# Patient Record
Sex: FEMALE | Race: ASIAN | NOT HISPANIC OR LATINO | Employment: FULL TIME | URBAN - METROPOLITAN AREA
[De-identification: names, ages, dates, MRNs, and addresses within clinical notes are randomized per-mention and may not be internally consistent; named-entity substitution may affect disease eponyms.]

---

## 2017-01-09 DIAGNOSIS — I10 ESSENTIAL HYPERTENSION WITH GOAL BLOOD PRESSURE LESS THAN 140/90: Primary | ICD-10-CM

## 2017-01-09 RX ORDER — LOSARTAN POTASSIUM 50 MG/1
50 TABLET ORAL DAILY
Qty: 30 TABLET | Refills: 0 | Status: SHIPPED | OUTPATIENT
Start: 2017-01-09 | End: 2017-01-31

## 2017-01-09 NOTE — TELEPHONE ENCOUNTER
Routing refill request to provider for review/approval because:  Emy given x1 and patient did not follow up, please advise  Patient needs to be seen because:  Needs nurse visit for BP check after dose adjustment on med (increase)

## 2017-01-31 ENCOUNTER — OFFICE VISIT (OUTPATIENT)
Dept: FAMILY MEDICINE | Facility: CLINIC | Age: 37
End: 2017-01-31

## 2017-01-31 VITALS
OXYGEN SATURATION: 99 % | BODY MASS INDEX: 39.63 KG/M2 | SYSTOLIC BLOOD PRESSURE: 152 MMHG | DIASTOLIC BLOOD PRESSURE: 105 MMHG | WEIGHT: 231 LBS | HEART RATE: 83 BPM | TEMPERATURE: 98.3 F

## 2017-01-31 DIAGNOSIS — H57.9 EYE PROBLEM: Primary | ICD-10-CM

## 2017-01-31 DIAGNOSIS — I10 ESSENTIAL HYPERTENSION WITH GOAL BLOOD PRESSURE LESS THAN 140/90: ICD-10-CM

## 2017-01-31 DIAGNOSIS — E11.9 TYPE 2 DIABETES MELLITUS WITHOUT COMPLICATION, WITHOUT LONG-TERM CURRENT USE OF INSULIN (H): ICD-10-CM

## 2017-01-31 RX ORDER — CIPROFLOXACIN HYDROCHLORIDE 3.5 MG/ML
1 SOLUTION/ DROPS TOPICAL 4 TIMES DAILY
Qty: 2 ML | Refills: 0 | Status: SHIPPED | OUTPATIENT
Start: 2017-01-31 | End: 2017-02-10

## 2017-01-31 RX ORDER — LOSARTAN POTASSIUM 50 MG/1
50 TABLET ORAL DAILY
Qty: 30 TABLET | Refills: 3 | Status: SHIPPED | OUTPATIENT
Start: 2017-01-31 | End: 2019-04-19

## 2017-01-31 ASSESSMENT — PAIN SCALES - GENERAL: PAINLEVEL: NO PAIN (0)

## 2017-01-31 NOTE — NURSING NOTE
36 year old  Chief Complaint   Patient presents with     Eye Problem     right eye pain when moved and readness in eye x 1 day       Blood pressure 150/107, pulse 83, temperature 98.3  F (36.8  C), weight 231 lb (104.781 kg), last menstrual period 01/25/2017, SpO2 99 %, not currently breastfeeding. Body mass index is 39.63 kg/(m^2).  Patient Active Problem List   Diagnosis     Rash and other nonspecific skin eruption     Asthma     Mild persistent asthma without complication     Acne, unspecified acne type     Benign essential hypertension     Type 2 diabetes mellitus without complication (H)     Vitamin D deficiency       Wt Readings from Last 2 Encounters:   01/31/17 231 lb (104.781 kg)   06/21/16 242 lb 1.3 oz (109.807 kg)     BP Readings from Last 3 Encounters:   01/31/17 150/107   09/28/16 134/91   06/21/16 138/96         Current Outpatient Prescriptions   Medication     MONTELUKAST SODIUM PO     losartan (COZAAR) 50 MG tablet     albuterol (VENTOLIN HFA) 108 (90 BASE) MCG/ACT inhaler     clindamycin (CLEOCIN T) 1 % lotion     mometasone (ASMANEX) 220 MCG/INH inhaler     No current facility-administered medications for this visit.       Social History   Substance Use Topics     Smoking status: Never Smoker      Smokeless tobacco: Never Used     Alcohol Use: 0.0 oz/week     0 Standard drinks or equivalent per week      Comment: occasionally       Health Maintenance Due   Topic Date Due     FOOT EXAM Q1 YEAR( NO INBASKET)  07/08/1980     EYE EXAM Q1 YEAR( NO INBASKET)  07/08/1980     MICROALBUMIN Q1 YEAR( NO INBASKET)  07/08/1980     ASTHMA ACTION PLAN Q1 YR (NO INBASKET)  07/08/1984     PAP SCREENING Q3 YR (SYSTEM ASSIGNED)  07/08/2000     TSH W/ FREE T4 REFLEX Q2 YEAR (NO INBASKET)  01/09/2015     INFLUENZA VACCINE (SYSTEM ASSIGNED)  09/01/2016     ASTHMA CONTROL TEST Q6 MOS (NO INBASKET)  12/21/2016     A1C Q6 MO( NO INBASKET)  12/21/2016       No results found for this basename: pap      January 31, 2017  10:53 AM

## 2017-01-31 NOTE — PROGRESS NOTES
Manisha Ovalles is a 36 year old female here for the following issues:    Right eye problem  Manisha is a 37 yo woman who wears contact lenses. Yesterday, while wearing her contacts she thought she might have gotten something in her eye. She removed her contact but later replaced it. Now her right eye is red, irritated. She is wearing her contacts to clinic today. No mattering, no fever, no vision changes. Mild headache.     HTN  She has a hx of HTN and had been prescribed  Losartan 50mg a day. However she admits she is not taking her medication. She would like rx to resume the medication. No current chest pain or leg edema.     Patient Active Problem List   Diagnosis     Rash and other nonspecific skin eruption     Asthma     Mild persistent asthma without complication     Acne, unspecified acne type     Benign essential hypertension     Type 2 diabetes mellitus without complication (H)     Vitamin D deficiency       Current Outpatient Prescriptions   Medication Sig Dispense Refill     losartan (COZAAR) 50 MG tablet Take 1 tablet (50 mg) by mouth daily Needs Nurse visit for BP check for any further refills. FINAL FILL 30 tablet 0     albuterol (VENTOLIN HFA) 108 (90 BASE) MCG/ACT inhaler Inhale 2 puffs into the lungs every 4 hours as needed for shortness of breath / dyspnea or wheezing 1 Inhaler 11     clindamycin (CLEOCIN T) 1 % lotion Apply topically 2 times daily 60 mL 3     mometasone (ASMANEX) 220 MCG/INH inhaler Inhale 2 puffs into the lungs daily 1 Inhaler 11       No Known Allergies     EXAM  /105 mmHg  Pulse 83  Temp(Src) 98.3  F (36.8  C)  Wt 231 lb (104.781 kg)  SpO2 99%  LMP 01/25/2017  Gen: Alert, pleasant, NAD  EYES: Scleral injection of right eye, PERRL,EOMI, no photophobia, no mattering  COR: S1,S2, no murmur  Lungs: CTA bilaterally, no rhonchi, wheezes or rales  Ext: no peripheral edema, pulses full    Procedure  Fluorescein stain to right eye and inspection with blue light. No distinct  abrasions    Assessment:  (H57.9) Eye problem  (primary encounter diagnosis)  Comment: Right eye irritation likely secondary to contact lens wear  Plan: ciprofloxacin (CILOXAN) 0.3 % ophthalmic         solution        Recommend she use drops QID x 5 days-10 days  If not improving, see eye doctor. Wear glasses until completely resolved, then wear new pair of lenses.    (I10) Essential hypertension with goal blood pressure less than 140/90  Comment: no current use of medication  Plan: losartan (COZAAR) 50 MG tablet        Resume med and recheck in 4-6 wks    (E11.9) Type 2 diabetes mellitus without complication, without long-term current use of insulin (H)  Comment: diagnosed 7/2016, she did not return for care   A1C      9.9   6/21/2016  A1C      5.7   1/9/2013  Plan: recommend she return for full diabetic visit    Amalia Mei MD  Internal Medicine/Pediatrics

## 2017-01-31 NOTE — MR AVS SNAPSHOT
After Visit Summary   2017    Manisha Ovalles    MRN: [id number]           Patient Information     Date Of Birth          1980        Visit Information        Provider Department      2017 10:20 AM Amalia Mei MD [facility]        Today's Diagnoses     Eye problem    -  1     Essential hypertension with goal blood pressure less than 140/90            Follow-ups after your visit        Who to contact     Please call your clinic at [phone] to:    Ask questions about your health    Make or cancel appointments    Discuss your medicines    Learn about your test results    Speak to your doctor   If you have compliments or concerns about an experience at your clinic, or if you wish to file a complaint, please contact [facility] Patient Relations at [phone] or email us at [email or web address]         Additional Information About Your Visit        MyChart Information     [place] [employer] is an electronic gateway that provides easy, online access to your medical records. With threadsy, you can request a clinic appointment, read your test results, renew a prescription or communicate with your care team.     To sign up for [place] [facility] visit the website at [email or web address]   You will be asked to enter the access code listed below, as well as some personal information. Please follow the directions to create your username and password.     Your access code is: [email or web address]  Expires: 2017 10:11 AM     Your access code will  in 90 days. If you need help or a new code, please contact your [facility] or call [phone] for assistance.        Care EveryWhere ID     This is your Care EveryWhere ID. This could be used by other organizations to access your [facility] medical records  [id number]        Your Vitals Were     Pulse Temperature Pulse Oximetry Last Period          83 98.3  F (36.8  C) 99%  01/25/2017         Blood Pressure from Last 3 Encounters:   01/31/17 152/105   09/28/16 134/91   06/21/16 138/96    Weight from Last 3 Encounters:   01/31/17 231 lb (104.781 kg)   06/21/16 242 lb 1.3 oz (109.807 kg)   01/07/14 231 lb 9.6 oz (105.053 kg)              Today, you had the following     No orders found for display         Today's Medication Changes          These changes are accurate as of: 1/31/17 11:22 AM.  If you have any questions, ask your nurse or doctor.               Start taking these medicines.        Dose/Directions    ciprofloxacin 0.3 % ophthalmic solution   Commonly known as:  CILOXAN   Used for:  Eye problem   Started by:  Amalia Mei MD        Dose:  1 drop   Place 1 drop into the right eye 4 times daily for 10 days   Quantity:  2 mL   Refills:  0         These medicines have changed or have updated prescriptions.        Dose/Directions    losartan 50 MG tablet   Commonly known as:  COZAAR   This may have changed:  additional instructions   Used for:  Essential hypertension with goal blood pressure less than 140/90   Changed by:  Amalia Mei MD        Dose:  50 mg   Take 1 tablet (50 mg) by mouth daily   Quantity:  30 tablet   Refills:  3            Where to get your medicines      These medications were sent to [id number] - [place], [place] - [address] [place] [address] AT [facility] & [address]  [address], [place] [address]     Phone:  [phone]    - ciprofloxacin 0.3 % ophthalmic solution  - losartan 50 MG tablet             Primary Care Provider Office Phone # Fax #    Amalia Mei [phone]       [place] [id number] [facility] [address]  [facility] [address]        Thank you!     Thank you for choosing [facility]  for your care. Our goal is always to provide you with excellent care. Hearing back from our patients is one way we can continue to improve our services. Please take a few minutes to complete the written  survey that you may receive in the mail after your visit with us. Thank you!             Your Updated Medication List - Protect others around you: Learn how to safely use, store and throw away your medicines at www.disposemymeds.org.          This list is accurate as of: 1/31/17 11:22 AM.  Always use your most recent med list.                   Brand Name Dispense Instructions for use    albuterol 108 (90 BASE) MCG/ACT Inhaler    VENTOLIN HFA    1 Inhaler    Inhale 2 puffs into the lungs every 4 hours as needed for shortness of breath / dyspnea or wheezing       ciprofloxacin 0.3 % ophthalmic solution    CILOXAN    2 mL    Place 1 drop into the right eye 4 times daily for 10 days       clindamycin 1 % lotion    CLEOCIN T    60 mL    Apply topically 2 times daily       losartan 50 MG tablet    COZAAR    30 tablet    Take 1 tablet (50 mg) by mouth daily       mometasone 220 MCG/INH Inhaler    ASMANEX    1 Inhaler    Inhale 2 puffs into the lungs daily       MONTELUKAST SODIUM PO

## 2019-04-19 ENCOUNTER — OFFICE VISIT (OUTPATIENT)
Dept: FAMILY MEDICINE | Facility: CLINIC | Age: 39
End: 2019-04-19
Payer: COMMERCIAL

## 2019-04-19 VITALS
RESPIRATION RATE: 24 BRPM | SYSTOLIC BLOOD PRESSURE: 136 MMHG | HEART RATE: 96 BPM | BODY MASS INDEX: 42.7 KG/M2 | TEMPERATURE: 97.8 F | OXYGEN SATURATION: 96 % | HEIGHT: 63 IN | DIASTOLIC BLOOD PRESSURE: 89 MMHG | WEIGHT: 241 LBS

## 2019-04-19 DIAGNOSIS — M75.01 ADHESIVE CAPSULITIS OF RIGHT SHOULDER: ICD-10-CM

## 2019-04-19 DIAGNOSIS — E66.01 CLASS 3 SEVERE OBESITY DUE TO EXCESS CALORIES WITH SERIOUS COMORBIDITY AND BODY MASS INDEX (BMI) OF 40.0 TO 44.9 IN ADULT (H): ICD-10-CM

## 2019-04-19 DIAGNOSIS — Z00.00 ROUTINE GENERAL MEDICAL EXAMINATION AT A HEALTH CARE FACILITY: Primary | ICD-10-CM

## 2019-04-19 DIAGNOSIS — E66.813 CLASS 3 SEVERE OBESITY DUE TO EXCESS CALORIES WITH SERIOUS COMORBIDITY AND BODY MASS INDEX (BMI) OF 40.0 TO 44.9 IN ADULT (H): ICD-10-CM

## 2019-04-19 DIAGNOSIS — J45.30 MILD PERSISTENT ASTHMA WITHOUT COMPLICATION: ICD-10-CM

## 2019-04-19 DIAGNOSIS — I10 ESSENTIAL HYPERTENSION WITH GOAL BLOOD PRESSURE LESS THAN 140/90: ICD-10-CM

## 2019-04-19 DIAGNOSIS — E11.9 TYPE 2 DIABETES MELLITUS WITHOUT COMPLICATION, WITHOUT LONG-TERM CURRENT USE OF INSULIN (H): ICD-10-CM

## 2019-04-19 LAB
BUN SERPL-MCNC: 11.6 MG/DL (ref 7–19)
CALCIUM SERPL-MCNC: 9.1 MG/DL (ref 8.5–10.1)
CHLORIDE SERPLBLD-SCNC: 106.7 MMOL/L (ref 98–110)
CO2 SERPL-SCNC: 21.6 MMOL/L (ref 20–32)
CREAT SERPL-MCNC: 0.6 MG/DL (ref 0.5–1)
GFR SERPL CREATININE-BSD FRML MDRD: >90 ML/MIN/1.7 M2
GLUCOSE SERPL-MCNC: 219.4 MG'DL (ref 70–99)
HBA1C MFR BLD: 9.4 % (ref 4.1–5.7)
HIV 1+2 AB+HIV1 P24 AG SERPL QL IA: NEGATIVE
POTASSIUM SERPL-SCNC: 4.3 MMOL/DL (ref 3.2–4.6)
SODIUM SERPL-SCNC: 136.7 MMOL/L (ref 132–142)

## 2019-04-19 RX ORDER — MONTELUKAST SODIUM 10 MG/1
10 TABLET ORAL PRN
Qty: 90 TABLET | Refills: 3 | Status: SHIPPED | OUTPATIENT
Start: 2019-04-19 | End: 2019-12-13

## 2019-04-19 RX ORDER — MONTELUKAST SODIUM 10 MG/1
10 TABLET ORAL PRN
COMMUNITY
End: 2019-04-19

## 2019-04-19 RX ORDER — MONTELUKAST SODIUM 10 MG/1
10 TABLET ORAL PRN
Qty: 90 TABLET | Refills: 3 | Status: SHIPPED | OUTPATIENT
Start: 2019-04-19 | End: 2019-04-19

## 2019-04-19 RX ORDER — ALBUTEROL SULFATE 90 UG/1
2 AEROSOL, METERED RESPIRATORY (INHALATION) EVERY 4 HOURS PRN
Qty: 18 G | Refills: 3 | Status: SHIPPED | OUTPATIENT
Start: 2019-04-19 | End: 2020-12-07

## 2019-04-19 RX ORDER — AMLODIPINE BESYLATE 5 MG/1
5 TABLET ORAL DAILY
Refills: 3 | COMMUNITY
Start: 2019-01-09 | End: 2019-04-19

## 2019-04-19 RX ORDER — LOSARTAN POTASSIUM 50 MG/1
50 TABLET ORAL DAILY
Qty: 90 TABLET | Refills: 3 | Status: SHIPPED | OUTPATIENT
Start: 2019-04-19 | End: 2019-12-13

## 2019-04-19 RX ORDER — LOSARTAN POTASSIUM 50 MG/1
50 TABLET ORAL DAILY
Qty: 30 TABLET | Refills: 3 | Status: SHIPPED | OUTPATIENT
Start: 2019-04-19 | End: 2019-04-19

## 2019-04-19 RX ORDER — ALBUTEROL SULFATE 90 UG/1
2 AEROSOL, METERED RESPIRATORY (INHALATION) EVERY 4 HOURS PRN
Qty: 18 G | Refills: 3 | Status: SHIPPED | OUTPATIENT
Start: 2019-04-19 | End: 2019-04-19

## 2019-04-19 RX ORDER — AMLODIPINE BESYLATE 5 MG/1
5 TABLET ORAL DAILY
Qty: 90 TABLET | Refills: 3 | Status: SHIPPED | OUTPATIENT
Start: 2019-04-19 | End: 2019-12-13

## 2019-04-19 RX ORDER — AMLODIPINE BESYLATE 5 MG/1
5 TABLET ORAL DAILY
Qty: 90 TABLET | Refills: 3 | Status: SHIPPED | OUTPATIENT
Start: 2019-04-19 | End: 2019-04-19

## 2019-04-19 ASSESSMENT — ASTHMA QUESTIONNAIRES
QUESTION_2 LAST FOUR WEEKS HOW OFTEN HAVE YOU HAD SHORTNESS OF BREATH: NOT AT ALL
ACT_TOTALSCORE: 25
QUESTION_4 LAST FOUR WEEKS HOW OFTEN HAVE YOU USED YOUR RESCUE INHALER OR NEBULIZER MEDICATION (SUCH AS ALBUTEROL): NOT AT ALL
QUESTION_3 LAST FOUR WEEKS HOW OFTEN DID YOUR ASTHMA SYMPTOMS (WHEEZING, COUGHING, SHORTNESS OF BREATH, CHEST TIGHTNESS OR PAIN) WAKE YOU UP AT NIGHT OR EARLIER THAN USUAL IN THE MORNING: NOT AT ALL
ACUTE_EXACERBATION_TODAY: NO
QUESTION_1 LAST FOUR WEEKS HOW MUCH OF THE TIME DID YOUR ASTHMA KEEP YOU FROM GETTING AS MUCH DONE AT WORK, SCHOOL OR AT HOME: NONE OF THE TIME
QUESTION_5 LAST FOUR WEEKS HOW WOULD YOU RATE YOUR ASTHMA CONTROL: COMPLETELY CONTROLLED

## 2019-04-19 ASSESSMENT — PATIENT HEALTH QUESTIONNAIRE - PHQ9
SUM OF ALL RESPONSES TO PHQ QUESTIONS 1-9: 0
5. POOR APPETITE OR OVEREATING: NOT AT ALL

## 2019-04-19 ASSESSMENT — ANXIETY QUESTIONNAIRES
7. FEELING AFRAID AS IF SOMETHING AWFUL MIGHT HAPPEN: NOT AT ALL
IF YOU CHECKED OFF ANY PROBLEMS ON THIS QUESTIONNAIRE, HOW DIFFICULT HAVE THESE PROBLEMS MADE IT FOR YOU TO DO YOUR WORK, TAKE CARE OF THINGS AT HOME, OR GET ALONG WITH OTHER PEOPLE: NOT DIFFICULT AT ALL
1. FEELING NERVOUS, ANXIOUS, OR ON EDGE: NOT AT ALL
6. BECOMING EASILY ANNOYED OR IRRITABLE: NOT AT ALL
5. BEING SO RESTLESS THAT IT IS HARD TO SIT STILL: NOT AT ALL
2. NOT BEING ABLE TO STOP OR CONTROL WORRYING: NOT AT ALL
GAD7 TOTAL SCORE: 0
3. WORRYING TOO MUCH ABOUT DIFFERENT THINGS: NOT AT ALL

## 2019-04-19 ASSESSMENT — MIFFLIN-ST. JEOR: SCORE: 1743.42

## 2019-04-19 ASSESSMENT — PAIN SCALES - GENERAL: PAINLEVEL: NO PAIN (0)

## 2019-04-19 NOTE — PROGRESS NOTES
Female Physical Note    Concerns today: She would like her scripts refilled, she was wondering if she needed a mammogram, would like referral for PT ([facility]), and she would like personal training script as well.      ROS:  CONSTITUTIONAL: no fatigue, no unexpected change in weight  SKIN: no worrisome rashes, no worrisome moles, no worrisome lesions  EYES: no acute vision problems or changes  ENT: no ear problems, no mouth problems, no throat problems  RESP: no significant cough, no shortness of breath  CV: no chest pain, no palpitations, no new or worsening peripheral edema  GI: no nausea, no vomiting, no constipation, no diarrhea    Sexually Active: Rarely, 2x in last year  Sexual concerns: No   Contraception:Details: condoms   P: 0  Menarche: 3/3/2019  Menses: q 28 days  Lasting: 3 days,  Light  STD History: Neg  Last Pap Smear Date: 2019?  normal  Abnormal Pap History: None    Patient Active Problem List   Diagnosis     Rash and other nonspecific skin eruption     Mild persistent asthma without complication     Acne, unspecified acne type     Benign essential hypertension     Type 2 diabetes mellitus without complication (H)     Vitamin D deficiency     Mild intermittent asthma     Obesity     Snoring       Current Outpatient Medications   Medication Sig Dispense Refill     albuterol (VENTOLIN HFA) 108 (90 Base) MCG/ACT inhaler Inhale 2 puffs into the lungs every 4 hours as needed for shortness of breath / dyspnea or wheezing 18 g 3     amLODIPine (NORVASC) 5 MG tablet Take 1 tablet (5 mg) by mouth daily 90 tablet 3     losartan (COZAAR) 50 MG tablet Take 1 tablet (50 mg) by mouth daily 90 tablet 3     metFORMIN (GLUCOPHAGE) 1000 MG tablet Take 1 tablet (1,000 mg) by mouth daily (with dinner) Patient is taking 1 tab daily instead. Stomach issues. 90 tablet 1     mometasone (ASMANEX) 220 MCG/INH inhaler Inhale 2 puffs into the lungs daily 1 Inhaler 11     montelukast (SINGULAIR) 10 MG tablet Take 1 tablet (10  mg) by mouth as needed 90 tablet 3     order for DME Equipment being ordered: personal training for 12 sessions 12 Units 0       Past Medical History:   Diagnosis Date     Acne      Asthma     mild     Diabetes (H)      Hypertension      Obesity      Snoring         Family History     Problem (# of Occurrences) Relation (Name,Age of Onset)    No Known Problems (18) Mother, Father, Maternal Grandmother, Maternal Grandfather, Paternal Grandmother, Paternal Grandfather, Brother, Sister, Son, Daughter, Maternal Half-Brother, Maternal Half-Sister, Paternal Half-Brother, Paternal Half-Sister, Niece, Nephew, Cousin, Other       Negative family history of: Unknown/Adopted, Cancer, Diabetes, Coronary Artery Disease, Heart Disease, Hypertension, Hyperlipidemia, Kidney Disease, Cerebrovascular Disease, Obesity, Thrombosis, Asthma, Arthritis, Thyroid Disease, Depression, Mental Illness, Substance Abuse, Cystic Fibrosis, Early Death, Coronary Artery Disease Early Onset, Heart Failure, Bleeding Diathesis, Dementia, Breast Cancer, Ovarian Cancer, Uterine Cancer, Prostate Cancer, Colorectal Cancer, Pancreatic Cancer, Lung Cancer, Melanoma, Autoimmune Disease, Genetic Disorder          Problem List Medication List and Allergy List were reviewed.    Patient is a new patient to this clinic and so  I reviewed/updated the Past Medical History, the Family History and the Social History.    Social History     Tobacco Use     Smoking status: Never Smoker     Smokeless tobacco: Never Used   Substance Use Topics     Alcohol use: Yes     Alcohol/week: 0.0 oz     Comment: occasionally     Single  Children ? no    Has anyone hurt you physically, for example by pushing, hitting, slapping or kicking you or forcing you to have sex? Denies  Do you feel threatened or controlled by a partner, ex-partner or anyone in your life? Denies    RISK BEHAVIORS AND HEALTHY HABITS:  Tobacco Use/Smoking: None  Illicit Drug Use: None  Do you use alcohol? Less  "than 1 drink per week  Diet (5-7 servings of fruits/veg daily): No, 3-4 servings per day  Exercise (30 min accumulated most days):No  Dental Care: Yes   Calcium 1500 mg/d:  No  Seat Belt Use: Yes     Pap/HPV cotest every 5 years for women 30-65   Testing not indicated  and HIV screening:  Recommended and patient accepted testing.  Breast CA Screening (>39 yo or 10 y before 1st degree relative diagnosis): Testing not indicated  and recommended follow up in 1 year  CV Risk based on Pooled Cohort Risk: 2.1%  Pooled Cohort Risk Calculator    Immunization History   Administered Date(s) Administered     DTaP, Unspecified 01/08/1980, 04/09/1980, 05/08/1980, 01/12/1981, 11/27/1984     Flu, Unspecified 11/30/1995, 11/15/2010     HepB-Adult 02/23/1995, 03/23/1995, 11/30/1995     Influenza (H1N1) 12/10/2009     Influenza (IIV3) PF 11/05/2008, 09/23/2009, 11/15/2010, 12/09/2012, 01/07/2014, 09/30/2016     Influenza Vaccine IM 3yrs+ 4 Valent IIV4 09/01/2017     Influenza Vaccine IM Ages 6-35 Months 4 Valent (PF) 11/15/2010     MMR 07/15/1981, 10/14/1992     OPV, unspecified 01/22/1980, 03/06/1980, 04/09/1980, 01/12/1981, 11/27/1984     Pneumococcal 23 valent 06/21/2016     TD (ADULT, 7+) 02/23/1995     TDAP Vaccine (Adacel) 04/02/2012     Tdap (Adacel,Boostrix) 04/02/2012     Tdap (Adult) Unspecified Formulation 12/06/2005    Reviewed Immunization Record Today    EXAMINATION:   /89   Pulse 96   Temp 97.8  F (36.6  C) (Oral)   Resp 24   Ht 1.61 m (5' 3.39\")   Wt 109.3 kg (241 lb)   SpO2 96%   Breastfeeding? No   BMI 42.17 kg/m    GENERAL: healthy, alert and no distress  EYES: Eyes grossly normal to inspection, extraocular movements - intact, and PERRL  HENT: ear canals- normal; TMs- normal; Nose- normal; Mouth- no ulcers, no lesions  NECK: no tenderness, no adenopathy, no asymmetry, no masses, no stiffness; thyroid- normal to palpation, appears obese  RESP: lungs clear to auscultation - no rales, no rhonchi, no " wheezes  CV: regular rates and rhythm, normal S1 S2, no S3 or S4 and no murmur, no click or rub -  ABDOMEN: soft, no tenderness, no  hepatosplenomegaly, no masses, normal bowel sounds. obese  MS: extremities- no gross deformities noted, no edema, internal and external rotation of the arms at shoulder normal, right arm with less internal rotation than left, no pain with movements of arm at elbow and shoulder. otherwise no swelling/redness  SKIN: no suspicious lesions, no rashes, acne on forehead  NEURO: strength and tone- normal, sensory exam- grossly normal, mentation- intact, speech- normal, reflexes- symmetric  PSYCH: Alert and oriented times 3; speech- coherent , normal rate and volume; able to articulate logical thoughts, able to abstract reason, no tangential thoughts, no hallucinations or delusions, affect- normal    ASSESSMENT/PLAN:  Manisha was seen today for physical.    Diagnoses and all orders for this visit:    Routine general medical examination at a health care facility: HIV screening negative.  -     HIV Ag/Ab Screen [place] ([facility])  -     Patient to locate clinic where she had PAP testing done this year  -     Mammogram in 1 year    Class 3 severe obesity due to excess calories with serious comorbidity and body mass index (BMI) of 40.0 to 44.9 in adult (H): like contributing to DM2 and HTN. Encouraged weight loss as 10-15 pounds loss would make a difference in her health.  -     order for DME; Equipment being ordered: personal training for 12 sessions  -     Discussed seeing a bariatrician such as Dr. Lucy Kaminski for non-surgical versus surgical management, patient states she will think about this  -     Consider liraglutide for obesity and DM2 management    Type 2 diabetes mellitus without complication, without long-term current use of insulin (H): likely 2/2 severe obesity. Not well managed. HgA1c today was 9.4% and random glucose was 219. Weight loss would help with diabetes  control.  -     Basic Metabolic Panel ([place])  -     metFORMIN (GLUCOPHAGE) 1000 MG tablet; Take 1 tablet (1,000 mg) by mouth daily (with dinner) Patient is taking 1 tab daily instead. Stomach issues.  -     Hemoglobin A1c ([facility])  -     Consider liraglutide, as above    Essential hypertension with goal blood pressure less than 140/90: stable.  -     amLODIPine (NORVASC) 5 MG tablet; Take 1 tablet (5 mg) by mouth daily  -     losartan (COZAAR) 50 MG tablet; Take 1 tablet (50 mg) by mouth daily    Mild persistent asthma without complication  -     albuterol (VENTOLIN HFA) 108 (90 Base) MCG/ACT inhaler; Inhale 2 puffs into the lungs every 4 hours as needed for shortness of breath / dyspnea or wheezing  -     mometasone (ASMANEX) 220 MCG/INH inhaler; Inhale 2 puffs into the lungs daily  -     montelukast (SINGULAIR) 10 MG tablet; Take 1 tablet (10 mg) by mouth as needed    Adhesive capsulitis of right shoulder: x-ray and workup initially done at [facility]. Patient would like to go back to [facility] for this. Appears to have limited range of motion in right shoulder when compared to left, but not pain.  -     PHYSICAL THERAPY REFERRAL; Future      Deepika Wu MD PGY1  I precepted today with Rodney Krause MD.

## 2019-04-19 NOTE — PROGRESS NOTES
Preceptor Attestation:   Patient seen, evaluated and discussed with the resident. I have verified the content of the note, which accurately reflects my assessment of the patient and the plan of care.   Supervising Physician:  Rodney Krause MD

## 2019-04-19 NOTE — NURSING NOTE
Chief Complaint   Patient presents with     Physical     Complete Physical Exam.        Patient declined Pap Smear Today     Naveen Myers, CMA

## 2019-04-19 NOTE — LETTER
April 23, 2019      Manisha Ovalles  [address]  [facility] [address]    Please see below for your test results.    Resulted Orders   HIV Ag/Ab Screen [place] ([facility])   Result Value Ref Range    HIV Antigen/Antibody Negative Negative    Narrative    Test performed by:  [facility]  [address] [place] [address]  Method is Abbott HIV Ag/Ab for the detection of HIV p24 antigen, HIV-1   antibodies and HIV-2 antibodies.   Basic Metabolic Panel ([place])   Result Value Ref Range    Urea Nitrogen 11.6 7.0 - 19.0 mg/dL    Calcium 9.1 8.5 - 10.1 mg/dL    Chloride 106.7 98.0 - 110.0 mmol/L    Carbon Dioxide 21.6 20.0 - 32.0 mmol/L    Creatinine 0.6 0.5 - 1.0 mg/dL    Glucose 219.4 (H) 70.0 - 99.0 mg'dL    Potassium 4.3 3.2 - 4.6 mmol/dL    Sodium 136.7 132.0 - 142.0 mmol/L    GFR Estimate >90 >60.0 mL/min/1.7 m2    GFR Estimate If Black >90 >60.0 mL/min/1.7 m2   Hemoglobin A1c ([facility])   Result Value Ref Range    Hemoglobin A1C 9.4 (H) 4.1 - 5.7 %     Dear Manisha Ovalles,     The results from your hemoglobin A1c testing (diabetes test) showed that your level is still high. Please schedule a follow up appointment so we can figure out what medications could help with your diabetes.     Your HIV testing was negative, which is good.      Thank you for allowing me to be a part of your health care!    Sincerely,   Dr. Wu  4/22/2019

## 2019-04-19 NOTE — PATIENT INSTRUCTIONS
Preventive Health Recommendations  Female Ages 26 - 39  Yearly exam:   See your health care provider every year in order to    Review health changes.     Discuss preventive care.      Review your medicines if you your doctor has prescribed any.    Until age 30: Get a Pap test every three years (more often if you have had an abnormal result).    After age 30: Talk to your doctor about whether you should have a Pap test every 3 years or have a Pap test with HPV screening every 5 years.   You do not need a Pap test if your uterus was removed (hysterectomy) and you have not had cancer.  You should be tested each year for STDs (sexually transmitted diseases), if you're at risk.   Talk to your provider about how often to have your cholesterol checked.  If you are at risk for diabetes, you should have a diabetes test (fasting glucose).  Shots: Get a flu shot each year. Get a tetanus shot every 10 years.   Nutrition:     Eat at least 5 servings of fruits and vegetables each day.    Eat whole-grain bread, whole-wheat pasta and brown rice instead of white grains and rice.    Get adequate Calcium and Vitamin D.     Lifestyle    Exercise at least 150 minutes a week (30 minutes a day, 5 days of the week). This will help you control your weight and prevent disease.    Limit alcohol to one drink per day.    No smoking.     Wear sunscreen to prevent skin cancer.    See your dentist every six months for an exam and cleaning.      For lasting weight loss success  Losing weight - and maintaining that weight loss over the long term - is hard. If you ve struggled to lose extra weight on your own and want lasting results, we can help. Our Comprehensive Weight Management program offers the evidence-based weight loss strategies -- both surgical and nonsurgical -- and support you need to lose the weight and keep it off.     Not sure which option is right for you? We re here to help. Call us at [phone].    April 19, 2019    PHYSICAL  THERAPY REFERRAL     Faxed Physical Therapy to [phone], who will contact patient to schedule appointment. Brandy CONTI

## 2019-04-20 ASSESSMENT — ANXIETY QUESTIONNAIRES: GAD7 TOTAL SCORE: 0

## 2019-04-20 ASSESSMENT — ASTHMA QUESTIONNAIRES: ACT_TOTALSCORE: 25

## 2019-04-22 ENCOUNTER — AMBULATORY - HEALTHEAST (OUTPATIENT)
Dept: ADMINISTRATIVE | Facility: REHABILITATION | Age: 39
End: 2019-04-22

## 2019-04-22 DIAGNOSIS — M75.01 ADHESIVE CAPSULITIS OF RIGHT SHOULDER: ICD-10-CM

## 2019-04-23 NOTE — RESULT ENCOUNTER NOTE
Please call patient with the following results.    Dear Manisha Ovalles,     The results from your hemoglobin A1c testing (diabetes test) showed that your level is still high. Please schedule a follow up appointment so we can figure out what medications could help with your diabetes.     Your HIV testing was negative, which is good.      Thank you for allowing me to be a part of your health care!    Sincerely,   Dr. Wu  4/22/2019

## 2019-08-14 ENCOUNTER — MEDICAL CORRESPONDENCE (OUTPATIENT)
Dept: HEALTH INFORMATION MANAGEMENT | Facility: CLINIC | Age: 39
End: 2019-08-14

## 2019-08-26 ENCOUNTER — MEDICAL CORRESPONDENCE (OUTPATIENT)
Dept: HEALTH INFORMATION MANAGEMENT | Facility: CLINIC | Age: 39
End: 2019-08-26

## 2019-08-26 ENCOUNTER — DOCUMENTATION ONLY (OUTPATIENT)
Dept: FAMILY MEDICINE | Facility: CLINIC | Age: 39
End: 2019-08-26

## 2019-08-26 NOTE — PROGRESS NOTES
To be completed in Nursing note:    Please reference list for forms that require a visit for completion.  Please remind patients that providers are given 3-5 business days to complete and return forms.      Form type:Letter of medical necessity    Date form received: 2019    Date form completed by Physician:2019    How was form returned to patient (mailed, faxed, or at  for patient to ):[phone]    Date form mailed/faxed/left at  for patient and sent to HIM for scannin2019      Once form is left for patient, faxed, or mailed PCS will then close the documentation only encounter.

## 2019-08-30 ENCOUNTER — MEDICAL CORRESPONDENCE (OUTPATIENT)
Dept: HEALTH INFORMATION MANAGEMENT | Facility: CLINIC | Age: 39
End: 2019-08-30

## 2019-09-30 ENCOUNTER — HEALTH MAINTENANCE LETTER (OUTPATIENT)
Age: 39
End: 2019-09-30

## 2019-10-21 DIAGNOSIS — E11.9 TYPE 2 DIABETES MELLITUS WITHOUT COMPLICATION, WITHOUT LONG-TERM CURRENT USE OF INSULIN (H): ICD-10-CM

## 2019-10-22 NOTE — TELEPHONE ENCOUNTER
Dr. Anderson as proxy for Dr. Wu. Patient's metformin was refilled, but as per Dr. Wu's note from 6 months ago patient needs to follow up for diabetes management. Please call patient and have her schedule a DM follow up with her PCP Dr. Wu. Thank you.    Telly Anderson MD PGY2  [facility]

## 2019-12-13 ENCOUNTER — OFFICE VISIT (OUTPATIENT)
Dept: FAMILY MEDICINE | Facility: CLINIC | Age: 39
End: 2019-12-13
Payer: COMMERCIAL

## 2019-12-13 VITALS
WEIGHT: 230 LBS | DIASTOLIC BLOOD PRESSURE: 94 MMHG | HEIGHT: 63 IN | TEMPERATURE: 98 F | SYSTOLIC BLOOD PRESSURE: 137 MMHG | HEART RATE: 96 BPM | BODY MASS INDEX: 40.75 KG/M2 | RESPIRATION RATE: 16 BRPM

## 2019-12-13 DIAGNOSIS — J45.30 MILD PERSISTENT ASTHMA WITHOUT COMPLICATION: ICD-10-CM

## 2019-12-13 DIAGNOSIS — E11.9 TYPE 2 DIABETES MELLITUS WITHOUT COMPLICATION, WITHOUT LONG-TERM CURRENT USE OF INSULIN (H): ICD-10-CM

## 2019-12-13 DIAGNOSIS — I10 ESSENTIAL HYPERTENSION WITH GOAL BLOOD PRESSURE LESS THAN 140/90: ICD-10-CM

## 2019-12-13 LAB
ANION GAP SERPL CALCULATED.3IONS-SCNC: 10 MMOL/L (ref 5–18)
BUN SERPL-MCNC: 12 MG/DL (ref 8–22)
CALCIUM SERPL-MCNC: 9.5 MG/DL (ref 8.5–10.5)
CHLORIDE SERPL-SCNC: 104 MMOL/L (ref 98–107)
CHOLEST SERPL-MCNC: 194.4 MG/DL (ref 0–200)
CHOLEST/HDLC SERPL: 3.2 {RATIO} (ref 0–5)
CO2 SERPL-SCNC: 25 MMOL/L (ref 22–31)
CREAT SERPL-MCNC: 0.81 MG/DL (ref 0.6–1.1)
CREAT UR-MCNC: 151.1 MG/DL
GLUCOSE SERPL-MCNC: 207 MG/DL (ref 70–125)
HBA1C MFR BLD: 9 % (ref 4.1–5.7)
HDLC SERPL-MCNC: 60.3 MG/DL
LDLC SERPL CALC-MCNC: 107 MG/DL (ref 0–129)
MICROALBUMIN UR-MCNC: 2.94 MG/DL (ref 0–1.99)
MICROALBUMIN/CREAT UR: 19.5 MG/G
POTASSIUM SERPL-SCNC: 4.1 MMOL/L (ref 3.5–5)
SODIUM SERPL-SCNC: 139 MMOL/L (ref 136–145)
TRIGL SERPL-MCNC: 136.6 MG/DL (ref 0–150)
TSH SERPL DL<=0.05 MIU/L-ACNC: 2.15 UIU/ML (ref 0.3–5)
VLDL CHOLESTEROL: 27.3 MG/DL (ref 7–32)

## 2019-12-13 RX ORDER — LOSARTAN POTASSIUM 50 MG/1
100 TABLET ORAL DAILY
Qty: 90 TABLET | Refills: 3 | Status: SHIPPED | OUTPATIENT
Start: 2019-12-13 | End: 2019-12-13

## 2019-12-13 RX ORDER — MONTELUKAST SODIUM 10 MG/1
10 TABLET ORAL PRN
Qty: 90 TABLET | Refills: 3 | Status: SHIPPED | OUTPATIENT
Start: 2019-12-13 | End: 2019-12-30

## 2019-12-13 RX ORDER — AMLODIPINE BESYLATE 10 MG/1
10 TABLET ORAL DAILY
Qty: 90 TABLET | Refills: 3 | Status: SHIPPED | OUTPATIENT
Start: 2019-12-13 | End: 2019-12-30

## 2019-12-13 RX ORDER — LOSARTAN POTASSIUM 100 MG/1
100 TABLET ORAL DAILY
Qty: 90 TABLET | Refills: 3 | Status: SHIPPED | OUTPATIENT
Start: 2019-12-13 | End: 2019-12-30

## 2019-12-13 RX ORDER — LIRAGLUTIDE 6 MG/ML
INJECTION SUBCUTANEOUS
Qty: 6.7 ML | Refills: 0 | Status: SHIPPED | OUTPATIENT
Start: 2019-12-13 | End: 2019-12-30

## 2019-12-13 RX ORDER — AMLODIPINE BESYLATE 5 MG/1
10 TABLET ORAL DAILY
Qty: 90 TABLET | Refills: 3 | Status: SHIPPED | OUTPATIENT
Start: 2019-12-13 | End: 2019-12-13

## 2019-12-13 ASSESSMENT — MIFFLIN-ST. JEOR: SCORE: 1682.4

## 2019-12-13 NOTE — PROGRESS NOTES
Dr Ruiz asked that I look into coverage options for GLP1s for this patient's insurance.  Looks like both Trulicity and Bydureon BCise would be covered at a brand name copay.  Both have coupons available online:    Trulicity:https://www.[employer]/diabetes-treatment-savings-card-and-support/savings-card/  Bydureon BCise: https://www.bye-Booking.comreon.OWM/bydureon-bcise/savings-and-support.html     These cards are a little difficult to interpret but it seems that she has more potential savings with Bydureon BCise.  I find that Trulicity is easier to use though.    Fide Infante, Pharm.D.

## 2019-12-13 NOTE — PROGRESS NOTES
Preceptor Attestation:   Patient seen, evaluated and discussed with the resident. I have verified the content of the note, which accurately reflects my assessment of the patient and the plan of care.   Supervising Physician:  Conner Ruiz MD.

## 2019-12-13 NOTE — PROGRESS NOTES
"       SUBJECTIVE       Manisha Ovalles is a 39 year old  female with a PMH significant for:     Patient Active Problem List   Diagnosis     Rash and other nonspecific skin eruption     Mild persistent asthma without complication     Acne, unspecified acne type     Benign essential hypertension     Type 2 diabetes mellitus without complication (H)     Vitamin D deficiency     Mild intermittent asthma     Obesity     Snoring     She presents with need for refills due to switching insurances next year. Has been going to the gym and working out with a  once per month. Not interested in liraglutide this visit. She has a cough and completed 5 days of prednisone. She said she still has URI symptoms and is slowly recovering. She isn't using her rescue inhaler as much as she should, per her. She is otherwise doing well. She denies any productive cough, fevers, weight gain. She reports intentional weight loss. She denies any episodes of low blood pressure with lightheaded, feeling faint, like she's about to pass out.    PMH, Medications and Allergies were reviewed and updated as needed.        REVIEW OF SYSTEMS     See HPI.        OBJECTIVE     Vitals:    12/13/19 0813 12/13/19 0815   BP: (!) 141/91 (!) 137/94   Pulse: 96    Resp: 16    Temp: 98  F (36.7  C)    Weight: 104.3 kg (230 lb)    Height: 1.6 m (5' 3\")      Body mass index is 40.74 kg/m .    Constitutional: Awake, alert, cooperative, no apparent distress, and appears stated age.  Eyes: Lids and lashes normal, pupils equal.  ENT: Normocephalic.  Neck: Supple, symmetrical.  Back: Symmetric, no curvature.  Lungs: No increased work of breathing, good air exchange, clear to auscultation bilaterally, no crackles or wheezing.  Cardiovascular: Regular rate and rhythm, normal S1 and S2, no S3 or S4, and no murmur noted.  Neurologic: Gait is normal.  Neuropsychiatric: Normal affect, mood, orientation, memory and insight.    Results for orders placed or " performed in visit on 12/13/19 (from the past 24 hour(s))   Hemoglobin A1c ([facility])   Result Value Ref Range    Hemoglobin A1C 9.0 (H) 4.1 - 5.7 %         ASSESSMENT AND PLAN     (I10) Essential hypertension with goal blood pressure less than 140/90  Comment: discussed that her BP is a little higher than her goal. Discussed increasing her losartan to 100mg daily and continuing her amlodipine at 10mg. She understood that exercise would help lower her BP and the importance of checking at home. She was agreeable with the plan.  Plan: amLODIPine (NORVASC) 10 MG tablet, losartan         (COZAAR) 100 MG tablet        Check BP at home, patient will purchase BP cuff, discussed proper use of home BP cuff    (E11.9) Type 2 diabetes mellitus without complication, without long-term current use of insulin (H)  Comment: her A1c is stable around 9.0% despite 11 pound weight loss. Had a long discussion of adding a GLP1 to her medication regimen, which she understood would help but is limited by cost. The pharmacist found coupons for her and she is will to explore the cost with coupons. She is exercising and praised her for her weight loss and committment to exercise.  Plan: metFORMIN (GLUCOPHAGE) 1000 MG tablet,         Hemoglobin A1c ([facility]), Lipid Panel         ([place]), Thyroid [place] ([facility]),         Microalbumin Random Ur ([facility]), Basic         Metabolic Profile ([facility]), liraglutide         (VICTOZA) 18 MG/3ML solution        Follow up on liraglutide at next visit    (J45.30) Mild persistent asthma without complication  Comment: no wheezing today. Her ACT score was 19, but she has a URI that she is recovering from.  Plan: montelukast (SINGULAIR) 10 MG tablet,         mometasone (ASMANEX) 220 MCG/INH inhaler        Follow up in 3 weeks if cough persists    RTC in 3 weeks for follow up of cough or sooner if develops new or worsening symptoms.    Deepika Wu MD PGY2  I precepted today with Conner  MD Sara.

## 2019-12-14 ASSESSMENT — ASTHMA QUESTIONNAIRES: ACT_TOTALSCORE: 19

## 2019-12-16 ENCOUNTER — MYC MEDICAL ADVICE (OUTPATIENT)
Dept: FAMILY MEDICINE | Facility: CLINIC | Age: 39
End: 2019-12-16

## 2019-12-16 ENCOUNTER — DOCUMENTATION ONLY (OUTPATIENT)
Dept: PHARMACY | Facility: PHYSICIAN GROUP | Age: 39
End: 2019-12-16

## 2019-12-16 NOTE — PROGRESS NOTES
Pharmacy Note    Dr. Wu stated that as of January, patient will no longer have insurance and will pay out of pocket for all her meds using her HSA. Dr. Wu requested medications, including any changes in meds, to reduce her out of pocket costs.    Chart below includes medication costs at area pharmacies (monthly). Yellow is using a Good Rx card, green is using a special membership, and no color is actual cash price.    Recommendations to keep costs down for patient are as follows:    Continue albuterol MDI, amlodipine, losartan, metformin, and montelukast    Discontinue Victoza and start generic pioglitazone (or glipizide)    Discontinue Asmanex (she is on high dose ICS) and start generic fluticasone/salmeterol 113/14 (generic Air Duo) 1 puff BID, which is med dose ICS/LABA    Lowest cost while keeping some convenience and not going to too many different pharmacies is if she got a Good Rx card and filled her oral meds at [facility] and her inhalers at [facility]    Medication qty [facility]   metformin 500 mg 60 $4.00  $4.00  $5.00  $4.46  $4.46  $12.86    Metformin 1000 mg BID 60 $4.00  $4.00  $5.00  $6.55  $6.82  $20.92    Amlodipine 10 mg QD 30 $9.00  $4.00  $15.00  $7.19  $7.43  $18.00    losartan 100 mg QD 30 $9.00  $4.00  $28.88  $6.43  $6.43  $29.91    montelukast 10 mg QD 30 $13.08  $4.00  $55.49  $9.58  $9.83  $18.00    glipizide 10 mg  60 $4.00  $4.00  $10.00  $8.45  $7.15  $14.10    pioglitazone 30 mg QD 30 $9.00  FREE $80.69  $11.72  $11.99  $81.46    Asmanex twist 60 $242.97   $249.96  $228.94  $230.90  $244.67    beclamethasone (QVAR) 1 $210.76   $210.76  $210.76  $210.70  $210.76    Flovent 1 $248.67  $213.63  $248.67  $248.67  $248.60  $248.67    Arnuity Ellipta 1 $177.50  $156.38  $177.50  $177.50  $177.50  $177.50    pulmicort flexhaler 1 $229.24  $229.24  $229.24  $229.24  $229.24  $229.24    fluticasone/salmeterol 113/14  1 puff BID 1 $55.50   $51.14  $91.88   $91.88  $63.10    Albuterol (generic) prn 1 $43.68   $23.31  $50.03  $30.10  $30.85      Informed Dr. Wu.  She will discuss with patient at her next visit.    Jacqueline Tee, Pharm.D.

## 2019-12-16 NOTE — RESULT ENCOUNTER NOTE
Dear Manisha Ovalles,     The results from your urine testing showed that you do have protein in your urine. This can happen from diabetes damaging the kidneys. We will keep an eye on your kidneys moving forward. The most important part of preventing more damage is to keep your diabetes under control. Your hemoglobin A1c, which is a test we use to estimate average blood sugar levels over the last 3 months, is still elevated to 9.0%, but is lower than it has been. This is likely due to the exercising you've been doing. As we talked about in your last visit I would recommend the liraglutide or another medicine of the same class that would be covered by your insurance. Your thyroid test was normal, which is good.    I would still keep your next appointment to talk about your diabetes further.    Thank you for allowing me to be a part of your health care!    Sincerely,   Dr. Wu  12/16/2019

## 2019-12-18 ENCOUNTER — TELEPHONE (OUTPATIENT)
Dept: FAMILY MEDICINE | Facility: CLINIC | Age: 39
End: 2019-12-18

## 2019-12-27 NOTE — TELEPHONE ENCOUNTER
You're on the schedule for 12/30 at 8am. I'm not sure if anyone reached out to you about this but wanted to make sure you knew.

## 2019-12-30 ENCOUNTER — OFFICE VISIT (OUTPATIENT)
Dept: FAMILY MEDICINE | Facility: CLINIC | Age: 39
End: 2019-12-30
Payer: COMMERCIAL

## 2019-12-30 VITALS
HEIGHT: 63 IN | HEART RATE: 105 BPM | RESPIRATION RATE: 16 BRPM | TEMPERATURE: 98.4 F | WEIGHT: 226 LBS | SYSTOLIC BLOOD PRESSURE: 126 MMHG | DIASTOLIC BLOOD PRESSURE: 86 MMHG | BODY MASS INDEX: 40.04 KG/M2

## 2019-12-30 DIAGNOSIS — J45.30 MILD PERSISTENT ASTHMA WITHOUT COMPLICATION: ICD-10-CM

## 2019-12-30 DIAGNOSIS — I10 ESSENTIAL HYPERTENSION WITH GOAL BLOOD PRESSURE LESS THAN 140/90: ICD-10-CM

## 2019-12-30 DIAGNOSIS — E11.9 TYPE 2 DIABETES MELLITUS WITHOUT COMPLICATION, WITHOUT LONG-TERM CURRENT USE OF INSULIN (H): ICD-10-CM

## 2019-12-30 RX ORDER — MONTELUKAST SODIUM 10 MG/1
10 TABLET ORAL PRN
Qty: 90 TABLET | Refills: 3 | Status: SHIPPED | OUTPATIENT
Start: 2019-12-30 | End: 2020-12-07

## 2019-12-30 RX ORDER — FLUTICASONE PROPIONATE AND SALMETEROL 113; 14 UG/1; UG/1
1 POWDER, METERED RESPIRATORY (INHALATION) 2 TIMES DAILY
Qty: 1 INHALER | Refills: 11 | Status: SHIPPED | OUTPATIENT
Start: 2019-12-30 | End: 2020-10-06

## 2019-12-30 RX ORDER — LOSARTAN POTASSIUM 100 MG/1
100 TABLET ORAL DAILY
Qty: 90 TABLET | Refills: 3 | Status: SHIPPED | OUTPATIENT
Start: 2019-12-30 | End: 2020-12-07

## 2019-12-30 RX ORDER — ATORVASTATIN CALCIUM 20 MG/1
20 TABLET, FILM COATED ORAL DAILY
Qty: 90 TABLET | Refills: 3 | Status: SHIPPED | OUTPATIENT
Start: 2019-12-30 | End: 2020-12-07

## 2019-12-30 RX ORDER — AMLODIPINE BESYLATE 10 MG/1
10 TABLET ORAL DAILY
Qty: 90 TABLET | Refills: 3 | Status: SHIPPED | OUTPATIENT
Start: 2019-12-30 | End: 2020-12-07

## 2019-12-30 RX ORDER — GLIPIZIDE 10 MG/1
10 TABLET ORAL
Qty: 90 TABLET | Refills: 3 | Status: SHIPPED | OUTPATIENT
Start: 2019-12-30 | End: 2020-06-02

## 2019-12-30 ASSESSMENT — MIFFLIN-ST. JEOR: SCORE: 1664.26

## 2019-12-30 NOTE — PROGRESS NOTES
"       SUBJECTIVE       Manisha Ovalles is a 39 year old  female with a PMH significant for:     Patient Active Problem List   Diagnosis     Rash and other nonspecific skin eruption     Mild persistent asthma without complication     Acne, unspecified acne type     Benign essential hypertension     Type 2 diabetes mellitus without complication (H)     Vitamin D deficiency     Mild intermittent asthma     Obesity     Snoring     She presents to follow up on her cough and discuss medication changes for 2020. We went through the chart put together by Dr. Tee, PharmD on 12/16/19 comparing pharmacy costs for different medications. She said that she would prefer to  her oral medications at [facility] due to convenience and the cost difference is that much. She said she'd be okay picking up fluticasone/salmeterol and albuterol at [facility] for cost saving reasons. She said that she can transfer her medications to the appropriate pharmacy after the first of the year.    Her cough continues to be a dry cough triggered by the dry hot air from her car. She hasn't had any other issues breathing. She otherwise feels fine.    PMH, Medications and Allergies were reviewed and updated as needed.        REVIEW OF SYSTEMS     See HPI        OBJECTIVE     Vitals:    12/30/19 0805   BP: 126/86   Pulse: 105   Resp: 16   Temp: 98.4  F (36.9  C)   Weight: 102.5 kg (226 lb)   Height: 1.6 m (5' 3\")     Body mass index is 40.03 kg/m .    Constitutional: Awake, alert, cooperative.  Lungs: No increased work of breathing, good air exchange, clear to auscultation bilaterally, no crackles or wheezing.  Cardiovascular: Regular rate and rhythm, normal S1 and S2, no S3 or S4, and no murmur noted.  Neuropsychiatric: Normal affect, mood, orientation, memory and insight.  Skin: No rashes, erythema, pallor, petechia or purpura.    No results found for this or any previous visit (from the past 24 hour(s)).        ASSESSMENT AND PLAN     (I10) " Essential hypertension with goal blood pressure less than 140/90  Comment: well controlled. Plan to continue on same medications for the year. Discussed at last visit.  Plan: amLODIPine (NORVASC) 10 MG tablet, losartan         (COZAAR) 100 MG tablet        Continue home BP checks and encouraged ongoing exercise    (E11.9) Type 2 diabetes mellitus without complication, without long-term current use of insulin (H)  Comment: due to cost will start glipizide instead of GLP1, which patient understands is not first line but rather an insurance/cost issue. Encouraged metformin BID to help with diabetes control and weight loss. She should also start a statin and this was sent to her pharmacy.  Plan: glipiZIDE (GLUCOTROL) 10 MG tablet, metFORMIN         (GLUCOPHAGE) 1000 MG tablet BID        Would be ideal to check A1c and follow up in 1-3 months, however patient cannot do this due to cost and lack of insurance    (J45.30) Mild persistent asthma without complication  Comment: well controlled, lungs are clear today. Discussed changing to a ICS/LABA for more appropriate maintenance of asthma and cost savings. Would prefer to follow up sooner than 1 year from now, but patient unable to do so due to insurance issues.  Plan: fluticasone-salmeterol (AIRDUO RESPICLICK)         113-14 MCG/ACT inhaler, montelukast (SINGULAIR)        10 MG tablet      RTC in 1 year for follow up or sooner if develops new or worsening symptoms.    Deepika Wu MD PGY2  I precepted today with Timothy Jones MD.

## 2019-12-30 NOTE — PROGRESS NOTES
Preceptor Attestation:   Patient seen, evaluated and discussed with the resident. I have verified the content of the note, which accurately reflects my assessment of the patient and the plan of care.   Supervising Physician:  Timothy Jones MD

## 2019-12-31 ENCOUNTER — MYC MEDICAL ADVICE (OUTPATIENT)
Dept: FAMILY MEDICINE | Facility: CLINIC | Age: 39
End: 2019-12-31

## 2020-01-02 NOTE — TELEPHONE ENCOUNTER
Yes, sorry. I meant to send you a message about this. I caught it after you left. You should also take the statin medication because of your diabetes. It's a long term medication to help prevent cardiovascular disease (stroke, heart attacks, etc.). You just want to watch out for muscle aches and stop taking it for you start to have those.

## 2020-02-05 DIAGNOSIS — E11.9 TYPE 2 DIABETES MELLITUS WITHOUT COMPLICATION, WITHOUT LONG-TERM CURRENT USE OF INSULIN (H): ICD-10-CM

## 2020-03-16 ENCOUNTER — TELEPHONE (OUTPATIENT)
Dept: FAMILY MEDICINE | Facility: CLINIC | Age: 40
End: 2020-03-16

## 2020-03-16 NOTE — TELEPHONE ENCOUNTER
Called patient in response to Yooli message regarding travel. Left message to call clinic.    Deepika Wu MD

## 2020-04-02 ENCOUNTER — TELEPHONE (OUTPATIENT)
Dept: FAMILY MEDICINE | Facility: CLINIC | Age: 40
End: 2020-04-02

## 2020-04-02 NOTE — TELEPHONE ENCOUNTER
Called patient and left voicemail letting Manisha TEDDY Ovalles know clinic is still open and she can schedule telephone visits with any questions or concerns at this time.    Deepika Wu MD  04/02/20  11:08 AM

## 2020-06-02 DIAGNOSIS — E11.9 TYPE 2 DIABETES MELLITUS WITHOUT COMPLICATION, WITHOUT LONG-TERM CURRENT USE OF INSULIN (H): ICD-10-CM

## 2020-06-02 RX ORDER — GLIPIZIDE 10 MG/1
TABLET ORAL
Qty: 90 TABLET | Refills: 0 | Status: SHIPPED | OUTPATIENT
Start: 2020-06-02 | End: 2020-07-22

## 2020-06-03 ENCOUNTER — TELEPHONE (OUTPATIENT)
Dept: FAMILY MEDICINE | Facility: CLINIC | Age: 40
End: 2020-06-03

## 2020-06-03 ENCOUNTER — MYC REFILL (OUTPATIENT)
Dept: FAMILY MEDICINE | Facility: CLINIC | Age: 40
End: 2020-06-03

## 2020-06-03 DIAGNOSIS — E11.9 TYPE 2 DIABETES MELLITUS WITHOUT COMPLICATION, WITHOUT LONG-TERM CURRENT USE OF INSULIN (H): ICD-10-CM

## 2020-06-03 NOTE — TELEPHONE ENCOUNTER
[facility] pharmacy unable to transfer prescription from [facility]. Patient also due for follow up appointment. Will send in a 30 day supply to [facility] pharmacy with note to patient to schedule a follow up appointment.     Jose Luis Neri  Pharmacy Resident

## 2020-06-03 NOTE — TELEPHONE ENCOUNTER
[facility] phone call message- general phone call:    Reason for call: They need clarifications on the instruction for the metformin    Action desired: call back.    Return call needed: Yes    OK to leave a message on voice mail? Yes    Advised patient to response may take up to 2 business days: Yes    Primary language: English      needed? No    Call taken on Ligia 3, 2020 at 12:07 PM by Aaron Gaxiola

## 2020-06-04 NOTE — TELEPHONE ENCOUNTER
Just FYI, patient doesn't have insurance in 2020 so she isn't scheduling follow up appointments until 2021.

## 2020-06-04 NOTE — TELEPHONE ENCOUNTER
I have verified the content of the note, which accurately reflects my assessment of the patient and the plan of care.   Fide Infante, [facility], PharmD

## 2020-07-22 DIAGNOSIS — E11.9 TYPE 2 DIABETES MELLITUS WITHOUT COMPLICATION, WITHOUT LONG-TERM CURRENT USE OF INSULIN (H): ICD-10-CM

## 2020-07-22 RX ORDER — GLIPIZIDE 10 MG/1
TABLET ORAL
Qty: 90 TABLET | Refills: 0 | Status: SHIPPED | OUTPATIENT
Start: 2020-07-22 | End: 2020-10-06

## 2020-08-18 ENCOUNTER — VIRTUAL VISIT (OUTPATIENT)
Dept: FAMILY MEDICINE | Facility: OTHER | Age: 40
End: 2020-08-18
Payer: COMMERCIAL

## 2020-08-18 PROCEDURE — 99421 OL DIG E/M SVC 5-10 MIN: CPT | Performed by: PHYSICIAN ASSISTANT

## 2020-08-18 NOTE — PROGRESS NOTES
"Date: 2020 16:52:21  Clinician: Chandrakant Cifuentes  Clinician NPI: [id number]  Patient: Manisha Ovalles  Patient : 1980  Patient Address: [address], [place] [address]  Patient Phone: [phone]  Visit Protocol: URI  Patient Summary:  Manisha is a 40 year old ( : 1980 ) female who initiated a Visit for COVID-19 (Coronavirus) evaluation and screening. When asked the question \"Please sign me up to receive news, health information and promotions from [employer].\", Manisha responded \"No\".    Manisha states her symptoms started 1-2 days ago.   Her symptoms consist of a cough, nasal congestion, and malaise. Manisha also feels feverish.   Symptom details     Nasal secretions: The color of her mucus is clear.    Cough: Manisha coughs a few times an hour and her cough is not more bothersome at night. Phlegm does not come into her throat when she coughs. She does not believe her cough is caused by post-nasal drip.     Temperature: Her current temperature is 99.6 degrees Fahrenheit.      Manisha denies having ear pain, headache, chills, nausea, sore throat, teeth pain, ageusia, diarrhea, vomiting, rhinitis, myalgias, anosmia, facial pain or pressure, and wheezing. She also denies taking antibiotic medication in the past month and having recent facial or sinus surgery in the past 60 days. She is not experiencing dyspnea.   Precipitating events  She has not recently been exposed to someone with influenza. Manisha has been in close contact with the following high risk individuals: people with asthma, heart disease or diabetes.   Pertinent COVID-19 (Coronavirus) information  In the past 14 days, Manisha has not worked in a congregate living setting.   She does not work or volunteer as healthcare worker or a  and does not work or volunteer in a healthcare facility.   Manisha also has not lived in a congregate living setting in the past 14 days. She does not live with a healthcare worker.   " Manisha has not had a close contact with a laboratory-confirmed COVID-19 patient within 14 days of symptom onset.   Since December 2019, Manisha and has had upper respiratory infection (URI) or influenza-like illness. Has not been diagnosed with lab-confirmed COVID-19 test      Date(s) of previous URI or influenza-like illness (free-text): Winter     Symptoms Manisha experienced during previous URI or influenza-like illness as reported by the patient (free-text): Cough        Pertinent medical history  Manisha does not get yeast infections when she takes antibiotics.   Manisha does not need a return to work/school note.   Weight: 230 lbs   Manisha does not smoke or use smokeless tobacco.   She denies pregnancy and denies breastfeeding. She has menstruated in the past month.   Weight: 230 lbs    MEDICATIONS: amlodipine-benazepril oral, losartan oral, glipizide oral, montelukast oral, metformin oral, Asmanex Twisthaler inhalation, ALLERGIES: NKDA  Clinician Response:  Dear Manisha,   Your symptoms show that you may have coronavirus (COVID-19). This illness can cause fever, cough and trouble breathing. Many people get a mild case and get better on their own. Some people can get very sick.  What should I do?  We would like to test you for this virus.   1. Please call [phone] to schedule your visit. Explain that you were referred by [facility] to have a COVID-19 test. Be ready to share your [facility] visit ID number.  The following will serve as your written order for this COVID Test, ordered by me, for the indication of suspected COVID [Z20.828]: The test will be ordered in [employer], our electronic health record, after you are scheduled. It will show as ordered and authorized by Mahamed Ruiz MD.  Order: COVID-19 (Coronavirus) PCR for SYMPTOMATIC testing from [facility].      2. When it's time for your COVID test:  Stay at least 6 feet away from others. (If someone will drive you to your test, stay in the backseat, as far away from  "the  as you can.)   Cover your mouth and nose with a mask, tissue or washcloth.  Go straight to the testing site. Don't make any stops on the way there or back.      3.Starting now: Stay home and away from others (self-isolate) until:   You've had no fever---and no medicine that reduces fever---for one full day (24 hours). And...   Your other symptoms have gotten better. For example, your cough or breathing has improved. And...   At least 10 days have passed since your symptoms started.       During this time, don't leave the house except for testing or medical care.   Stay in your own room, even for meals. Use your own bathroom if you can.   Stay away from others in your home. No hugging, kissing or shaking hands. No visitors.  Don't go to work, school or anywhere else.    Clean \"high touch\" surfaces often (doorknobs, counters, handles, etc.). Use a household cleaning spray or wipes. You'll find a full list of  on the EPA website: www.epa.gov/pesticide-registration/list-n-disinfectants-use-against-sars-cov-2.   Cover your mouth and nose with a mask, tissue or washcloth to avoid spreading germs.  Wash your hands and face often. Use soap and water.  Caregivers in these groups are at risk for severe illness due to COVID-19:  o People 65 years and older  o People who live in a nursing home or long-term care facility  o People with chronic disease (lung, heart, cancer, diabetes, kidney, liver, immunologic)  o People who have a weakened immune system, including those who:   Are in cancer treatment  Take medicine that weakens the immune system, such as corticosteroids  Had a bone marrow or organ transplant  Have an immune deficiency  Have poorly controlled HIV or AIDS  Are obese (body mass index of 40 or higher)  Smoke regularly   o Caregivers should wear gloves while washing dishes, handling laundry and cleaning bedrooms and bathrooms.  o Use caution when washing and drying laundry: Don't shake dirty " laundry, and use the warmest water setting that you can.  o For more tips, go to www.cdc.gov/coronavirus/2019-ncov/downloads/10Things.pdf.    4.Sign up for [employer]. We know it's scary to hear that you might have COVID-19. We want to track your symptoms to make sure you're okay over the next 2 weeks. Please look for an email from [employer]---this is a free, online program that we'll use to keep in touch. To sign up, follow the link in the email. Learn more at [email or web address]  How can I take care of myself?   Get lots of rest. Drink extra fluids (unless a doctor has told you not to).   Take Tylenol (acetaminophen) for fever or pain. If you have liver or kidney problems, ask your family doctor if it's okay to take Tylenol.   Adults can take either:    650 mg (two 325 mg pills) every 4 to 6 hours, or...   1,000 mg (two 500 mg pills) every 8 hours as needed.    Note: Don't take more than 3,000 mg in one day. Acetaminophen is found in many medicines (both prescribed and over-the-counter medicines). Read all labels to be sure you don't take too much.   For children, check the Tylenol bottle for the right dose. The dose is based on the child's age or weight.    If you have other health problems (like cancer, heart failure, an organ transplant or severe kidney disease): Call your specialty clinic if you don't feel better in the next 2 days.       Know when to call 911. Emergency warning signs include:    Trouble breathing or shortness of breath Pain or pressure in the chest that doesn't go away Feeling confused like you haven't felt before, or not being able to wake up Bluish-colored lips or face.  Where can I get more information?    [employer] [place] -- About COVID-19: [email or web address]   CDC -- What to Do If You're Sick: www.cdc.gov/coronavirus/2019-ncov/about/steps-when-sick.html   CDC -- Ending Home Isolation: www.cdc.gov/coronavirus/2019-ncov/hcp/disposition-in-home-patients.html    CDC -- Caring for Someone: www.cdc.gov/coronavirus/2019-ncov/if-you-are-sick/care-for-someone.html   [facility] -- Interim Guidance for Hospital Discharge to Home: www.health.[facility].[place]/diseases/coronavirus/hcp/hospdischarge.pdf   [facility] clinical trials (COVID-19 research studies): clinicalaffairs.[facility]-clinical-trials    Below are the COVID-19 hotlines at the [place] Department of Health ([facility]). Interpreters are available.    For health questions: Call 511-336-6239 or 1-620.106.9697 (7 a.m. to 7 p.m.) For questions about schools and childcare: Call 013-501-9104 or 1-705.296.4652 (7 a.m. to 7 p.m.)    Diagnosis: Acute upper respiratory infection, unspecified  Diagnosis ICD: J06.9

## 2020-08-19 DIAGNOSIS — Z20.822 SUSPECTED 2019 NOVEL CORONAVIRUS INFECTION: Primary | ICD-10-CM

## 2020-08-19 DIAGNOSIS — Z20.822 SUSPECTED 2019 NOVEL CORONAVIRUS INFECTION: ICD-10-CM

## 2020-08-19 PROCEDURE — U0003 INFECTIOUS AGENT DETECTION BY NUCLEIC ACID (DNA OR RNA); SEVERE ACUTE RESPIRATORY SYNDROME CORONAVIRUS 2 (SARS-COV-2) (CORONAVIRUS DISEASE [COVID-19]), AMPLIFIED PROBE TECHNIQUE, MAKING USE OF HIGH THROUGHPUT TECHNOLOGIES AS DESCRIBED BY CMS-2020-01-R: HCPCS | Performed by: FAMILY MEDICINE

## 2020-08-20 LAB
SARS-COV-2 RNA SPEC QL NAA+PROBE: NOT DETECTED
SPECIMEN SOURCE: NORMAL

## 2020-10-06 ENCOUNTER — MYC REFILL (OUTPATIENT)
Dept: FAMILY MEDICINE | Facility: CLINIC | Age: 40
End: 2020-10-06

## 2020-10-06 DIAGNOSIS — J45.30 MILD PERSISTENT ASTHMA WITHOUT COMPLICATION: ICD-10-CM

## 2020-10-06 DIAGNOSIS — E11.9 TYPE 2 DIABETES MELLITUS WITHOUT COMPLICATION, WITHOUT LONG-TERM CURRENT USE OF INSULIN (H): ICD-10-CM

## 2020-10-06 RX ORDER — FLUTICASONE PROPIONATE AND SALMETEROL 113; 14 UG/1; UG/1
1 POWDER, METERED RESPIRATORY (INHALATION) 2 TIMES DAILY
Qty: 1 INHALER | Refills: 11 | Status: SHIPPED | OUTPATIENT
Start: 2020-10-06 | End: 2020-12-07

## 2020-10-06 RX ORDER — GLIPIZIDE 10 MG/1
10 TABLET ORAL
Qty: 180 TABLET | Refills: 0 | Status: SHIPPED | OUTPATIENT
Start: 2020-10-06 | End: 2020-12-07

## 2020-12-07 ENCOUNTER — VIRTUAL VISIT (OUTPATIENT)
Dept: FAMILY MEDICINE | Facility: CLINIC | Age: 40
End: 2020-12-07
Payer: COMMERCIAL

## 2020-12-07 DIAGNOSIS — E11.9 TYPE 2 DIABETES MELLITUS WITHOUT COMPLICATION, WITHOUT LONG-TERM CURRENT USE OF INSULIN (H): Primary | ICD-10-CM

## 2020-12-07 DIAGNOSIS — J45.30 MILD PERSISTENT ASTHMA WITHOUT COMPLICATION: ICD-10-CM

## 2020-12-07 DIAGNOSIS — K21.9 GASTROESOPHAGEAL REFLUX DISEASE, UNSPECIFIED WHETHER ESOPHAGITIS PRESENT: ICD-10-CM

## 2020-12-07 DIAGNOSIS — R45.89 FEELING SAD: ICD-10-CM

## 2020-12-07 DIAGNOSIS — I10 ESSENTIAL HYPERTENSION WITH GOAL BLOOD PRESSURE LESS THAN 140/90: ICD-10-CM

## 2020-12-07 PROCEDURE — 99214 OFFICE O/P EST MOD 30 MIN: CPT | Performed by: STUDENT IN AN ORGANIZED HEALTH CARE EDUCATION/TRAINING PROGRAM

## 2020-12-07 RX ORDER — FLUTICASONE PROPIONATE AND SALMETEROL 113; 14 UG/1; UG/1
1 POWDER, METERED RESPIRATORY (INHALATION) 2 TIMES DAILY
Qty: 1 INHALER | Refills: 11 | Status: SHIPPED | OUTPATIENT
Start: 2020-12-07

## 2020-12-07 RX ORDER — OMEPRAZOLE 20 MG/1
20 TABLET, DELAYED RELEASE ORAL DAILY
Qty: 90 TABLET | Refills: 3 | Status: SHIPPED | OUTPATIENT
Start: 2020-12-07

## 2020-12-07 RX ORDER — AMLODIPINE BESYLATE 10 MG/1
10 TABLET ORAL DAILY
Qty: 90 TABLET | Refills: 3 | Status: SHIPPED | OUTPATIENT
Start: 2020-12-07 | End: 2021-10-19

## 2020-12-07 RX ORDER — ATORVASTATIN CALCIUM 40 MG/1
40 TABLET, FILM COATED ORAL DAILY
Qty: 90 TABLET | Refills: 3 | Status: SHIPPED | OUTPATIENT
Start: 2020-12-07 | End: 2021-10-19

## 2020-12-07 RX ORDER — LOSARTAN POTASSIUM 100 MG/1
100 TABLET ORAL DAILY
Qty: 90 TABLET | Refills: 3 | Status: SHIPPED | OUTPATIENT
Start: 2020-12-07 | End: 2021-10-19

## 2020-12-07 RX ORDER — GLIPIZIDE 10 MG/1
10 TABLET ORAL
Qty: 180 TABLET | Refills: 0 | Status: SHIPPED | OUTPATIENT
Start: 2020-12-07 | End: 2021-03-01

## 2020-12-07 RX ORDER — ALBUTEROL SULFATE 90 UG/1
2 AEROSOL, METERED RESPIRATORY (INHALATION) EVERY 4 HOURS PRN
Qty: 18 G | Refills: 3 | Status: SHIPPED | OUTPATIENT
Start: 2020-12-07

## 2020-12-07 RX ORDER — MONTELUKAST SODIUM 10 MG/1
10 TABLET ORAL PRN
Qty: 90 TABLET | Refills: 3 | Status: SHIPPED | OUTPATIENT
Start: 2020-12-07 | End: 2020-12-11

## 2020-12-07 NOTE — PROGRESS NOTES
Preceptor Attestation:   I talked to the patient on the phone. I discussed the patient with the resident. I have verified the content of the note, which accurately reflects my assessment of the patient and the plan of care.   Supervising Physician:  Cal Noland MD.

## 2020-12-07 NOTE — PROGRESS NOTES
"Family Medicine Telephone Visit Note         Telephone Visit Consent   Patient was verbally read the following and verbal consent was obtained.    \"Telephone visits are billed at different rates depending on your insurance coverage. During this emergency period, for some insurers they may be billed the same as an in-person visit.  Please reach out to your insurance provider with any questions.  If during the course of the call the physician/provider feels a telephone visit is not appropriate, you will not be charged for this service.\"    Name person giving consent:  Patient   Date verbal consent given:  12/7/2020  Time verbal consent given:  3:22 PM        Chief Complaint   Patient presents with     Refill Request                HPI   Patients name: Manisha  Appointment start time:  3:36 PM      Current Outpatient Medications   Medication Sig Dispense Refill     albuterol (VENTOLIN HFA) 108 (90 Base) MCG/ACT inhaler Inhale 2 puffs into the lungs every 4 hours as needed for shortness of breath / dyspnea or wheezing 18 g 3     amLODIPine (NORVASC) 10 MG tablet Take 1 tablet (10 mg) by mouth daily 90 tablet 3     atorvastatin (LIPITOR) 40 MG tablet Take 1 tablet (40 mg) by mouth daily 90 tablet 3     fluticasone-salmeterol (AIRDUO RESPICLICK) 113-14 MCG/ACT inhaler Inhale 1 puff into the lungs 2 times daily 1 Inhaler 11     glipiZIDE (GLUCOTROL) 10 MG tablet Take 1 tablet (10 mg) by mouth 2 times daily (before meals) 180 tablet 0     losartan (COZAAR) 100 MG tablet Take 1 tablet (100 mg) by mouth daily 90 tablet 3     metFORMIN (GLUCOPHAGE) 500 MG tablet Take 2 tablets (1,000 mg) by mouth 2 times daily (with meals) Patient is taking 1 tab daily instead. Stomach issues. 120 tablet 0     montelukast (SINGULAIR) 10 MG tablet Take 1 tablet (10 mg) by mouth as needed 90 tablet 3     omeprazole (PRILOSEC OTC) 20 MG EC tablet Take 1 tablet (20 mg) by mouth daily 90 tablet 3     order for DME Equipment being ordered: personal " "training for 12 sessions 12 Units 0     No Known Allergies    She is calling about medication refills.    She had visited a gastroenterologist for acid reflux and the omeprazole is cheaper with a script. She would like a script for this.    She is on the same insurance in 2021.    Everything else is going okay, no issues.     She would like to delay lab testing for the time being due to current pandemic. She otherwise feels fine and hasn't noticed any symptoms. She works from home and does not see other people, which has been hard for her.      She says she has mild depression. Occasionally she feels more depressed, emotional, short, and feeling challenged by the loneliness. This comes and goes. She doesn't eat as many meals as before and skips lunch. She hasn't noticed any changes to her sleep.           Review of Systems:     See HPI         Physical Exam:     There were no vitals taken for this visit.  Estimated body mass index is 40.03 kg/m  as calculated from the following:    Height as of 12/30/19: 1.6 m (5' 3\").    Weight as of 12/30/19: 102.5 kg (226 lb).    Exam:  Constitutional: healthy, alert and no distress  Psychiatric: mentation appears normal and affect normal/bright     Results from the last 24 hoursNo results found for this or any previous visit (from the past 24 hour(s)).        Assessment and Plan   Manisha was seen today for refill request.    Diagnoses and all orders for this visit:    Type 2 diabetes mellitus without complication, without long-term current use of insulin (H): based on guidelines from American Diabetes Association, she should be on metformin and liraglutide for primary management of diabetes. The liraglutide has been unaffordable for Manisha, so she is on glipizide instead. It would be ideal to check A1c and see how well metformin and glipizide are managing her diabetes, but Manisha would like to defer this until after the pandemic. Did discuss that our lab/clinic is open to " testing if she changes her mind about checking A1c. Reviewed recommendations for taking atorvastatin for stroke/heart attack prevention and Manisha would be okay starting this medication. Reviewed that muscle pain can be a side effect of statin medications and that she should stop taking the medicine and call us if that happens.   -     glipiZIDE (GLUCOTROL) 10 MG tablet; Take 1 tablet (10 mg) by mouth 2 times daily (before meals)  -     metFORMIN (GLUCOPHAGE) 500 MG tablet; Take 2 tablets (1,000 mg) by mouth 2 times daily (with meals) Patient is taking 1 tab daily instead. Stomach issues.  -     atorvastatin (LIPITOR) 40 MG tablet; Take 1 tablet (40 mg) by mouth daily  -     Consider home glucose monitor and test strips at next visit to at least be able to assess diabetes at home moving forward (cheaper ones can be bought without insurance at [facility]    Mild persistent asthma without complication  -     montelukast (SINGULAIR) 10 MG tablet; Take 1 tablet (10 mg) by mouth as needed  -     fluticasone-salmeterol (AIRDUO RESPICLICK) 113-14 MCG/ACT inhaler; Inhale 1 puff into the lungs 2 times daily  -     albuterol (VENTOLIN HFA) 108 (90 Base) MCG/ACT inhaler; Inhale 2 puffs into the lungs every 4 hours as needed for shortness of breath / dyspnea or wheezing    Essential hypertension with goal blood pressure less than 140/90  -     amLODIPine (NORVASC) 10 MG tablet; Take 1 tablet (10 mg) by mouth daily  -     losartan (COZAAR) 100 MG tablet; Take 1 tablet (100 mg) by mouth daily  -     If Manisha comes in for an in person visit, would consider giving her one of the home blood pressure cuffs we have in clinic    Gastroesophageal reflux disease, unspecified whether esophagitis present  -     omeprazole (PRILOSEC OTC) 20 MG EC tablet; Take 1 tablet (20 mg) by mouth daily    Feeling sad: reviewed symptoms of depression and discussed when medications are helpful in managing depression. As she does not have a significant  change in her appetite or sleep patterns she does not meet criteria for depression. However, discussed that for low mood or mild symptoms of depression talking to a therapist can be helpful. She declined mental health referral today. She was provided with a way to access therapists (via her insurance) without having to schedule another appointment should the need arise.       Refilled medications that would be required in the next 3 months.     After Visit Information:  Patient chose to view AVS via [employer]    No follow-ups on file.    Appointment end time: 3:47 PM  This is a telephone visit that took 11 minutes.      Clinician location:  [facility]     Deepika Wu MD  I precepted today with Dr Noland.

## 2020-12-10 DIAGNOSIS — J45.30 MILD PERSISTENT ASTHMA WITHOUT COMPLICATION: ICD-10-CM

## 2020-12-11 RX ORDER — MONTELUKAST SODIUM 10 MG/1
TABLET ORAL
Qty: 90 TABLET | Refills: 0 | Status: SHIPPED | OUTPATIENT
Start: 2020-12-11 | End: 2021-03-01

## 2021-01-15 ENCOUNTER — HEALTH MAINTENANCE LETTER (OUTPATIENT)
Age: 41
End: 2021-01-15

## 2021-03-01 ENCOUNTER — TELEPHONE (OUTPATIENT)
Dept: FAMILY MEDICINE | Facility: CLINIC | Age: 41
End: 2021-03-01

## 2021-03-01 DIAGNOSIS — E11.9 TYPE 2 DIABETES MELLITUS WITHOUT COMPLICATION, WITHOUT LONG-TERM CURRENT USE OF INSULIN (H): ICD-10-CM

## 2021-03-01 DIAGNOSIS — J45.30 MILD PERSISTENT ASTHMA WITHOUT COMPLICATION: ICD-10-CM

## 2021-03-01 RX ORDER — GLIPIZIDE 10 MG/1
TABLET ORAL
Qty: 180 TABLET | Refills: 0 | Status: SHIPPED | OUTPATIENT
Start: 2021-03-01 | End: 2021-08-07

## 2021-03-01 RX ORDER — MONTELUKAST SODIUM 10 MG/1
TABLET ORAL
Qty: 90 TABLET | Refills: 0 | Status: SHIPPED | OUTPATIENT
Start: 2021-03-01 | End: 2021-08-07

## 2021-03-01 NOTE — TELEPHONE ENCOUNTER
[facility] phone call message- general phone call:    Reason for call: Pharmacy is calling to get clarification on medication metFORMIN (GLUCOPHAGE) 500 MG tablet    Return call needed: Yes    OK to leave a message on voice mail? Yes    Primary language: English      needed? No    Call taken on March 1, 2021 at 10:59 AM by Grisel Flores-Cardona

## 2021-03-25 ENCOUNTER — IMMUNIZATION (OUTPATIENT)
Dept: NURSING | Facility: CLINIC | Age: 41
End: 2021-03-25
Payer: COMMERCIAL

## 2021-03-25 PROCEDURE — 91300 PR COVID VAC PFIZER DIL RECON 30 MCG/0.3 ML IM: CPT

## 2021-03-25 PROCEDURE — 0001A PR COVID VAC PFIZER DIL RECON 30 MCG/0.3 ML IM: CPT

## 2021-04-15 ENCOUNTER — IMMUNIZATION (OUTPATIENT)
Dept: NURSING | Facility: CLINIC | Age: 41
End: 2021-04-15
Attending: FAMILY MEDICINE
Payer: COMMERCIAL

## 2021-04-15 PROCEDURE — 0002A PR COVID VAC PFIZER DIL RECON 30 MCG/0.3 ML IM: CPT

## 2021-04-15 PROCEDURE — 91300 PR COVID VAC PFIZER DIL RECON 30 MCG/0.3 ML IM: CPT

## 2021-04-16 DIAGNOSIS — E11.9 TYPE 2 DIABETES MELLITUS WITHOUT COMPLICATION, WITHOUT LONG-TERM CURRENT USE OF INSULIN (H): ICD-10-CM

## 2021-08-06 DIAGNOSIS — E11.9 TYPE 2 DIABETES MELLITUS WITHOUT COMPLICATION, WITHOUT LONG-TERM CURRENT USE OF INSULIN (H): ICD-10-CM

## 2021-08-06 DIAGNOSIS — J45.30 MILD PERSISTENT ASTHMA WITHOUT COMPLICATION: ICD-10-CM

## 2021-08-07 RX ORDER — MONTELUKAST SODIUM 10 MG/1
TABLET ORAL
Qty: 90 TABLET | Refills: 0 | Status: SHIPPED | OUTPATIENT
Start: 2021-08-07 | End: 2021-10-19

## 2021-08-09 NOTE — TELEPHONE ENCOUNTER
Copying Dr. Shepherd's note for documentation purposes:    Hello there,     I approved the medication refill requests for this patient, but she hasn't been in in a while to recheck how this medication is working for her diabetes. Would you call her to try to schedule a diabetes appointment and recheck her A1c? Thank you!     -Sher Shepherd    Message text

## 2021-08-11 NOTE — TELEPHONE ENCOUNTER
Patient denied follow-up visit and said she would check in again at the end of the year (December). Emphasized need to recheck labs and for medication purposes but was unable to schedule.      Jabier Colmenares, EMT  2:51 PM  8/11/2021

## 2021-08-23 ENCOUNTER — TELEPHONE (OUTPATIENT)
Dept: FAMILY MEDICINE | Facility: CLINIC | Age: 41
End: 2021-08-23

## 2021-08-29 ENCOUNTER — HEALTH MAINTENANCE LETTER (OUTPATIENT)
Age: 41
End: 2021-08-29

## 2021-10-24 ENCOUNTER — HEALTH MAINTENANCE LETTER (OUTPATIENT)
Age: 41
End: 2021-10-24

## 2021-10-26 ENCOUNTER — TELEPHONE (OUTPATIENT)
Dept: FAMILY MEDICINE | Facility: CLINIC | Age: 41
End: 2021-10-26

## 2021-10-26 NOTE — TELEPHONE ENCOUNTER
Patient Quality Outreach      Summary:    Patient has the following on her problem list/HM:   Asthma review       ACT Total Scores 12/13/2019   ACT TOTAL SCORE (Goal Greater than or Equal to 20) 19   In the past 12 months, how many times did you visit the emergency room for your asthma without being admitted to the hospital? 0   In the past 12 months, how many times were you hospitalized overnight because of your asthma? 0          Patient is due/failing the following:   Asthma  -  Asthma follow-up visit    Type of outreach:    Phone, left message for patient/parent to call back.    Questions for provider review:    None                                                                                                                                     Pedro Luis Fisher MA       Chart routed

## 2021-11-04 DIAGNOSIS — E11.9 TYPE 2 DIABETES MELLITUS WITHOUT COMPLICATION, WITHOUT LONG-TERM CURRENT USE OF INSULIN (H): ICD-10-CM

## 2022-01-14 DIAGNOSIS — E11.9 TYPE 2 DIABETES MELLITUS WITHOUT COMPLICATION, WITHOUT LONG-TERM CURRENT USE OF INSULIN (H): ICD-10-CM

## 2022-01-15 RX ORDER — ATORVASTATIN CALCIUM 40 MG/1
40 TABLET, FILM COATED ORAL DAILY
Qty: 30 TABLET | Refills: 3 | Status: SHIPPED | OUTPATIENT
Start: 2022-01-15

## 2022-02-13 ENCOUNTER — HEALTH MAINTENANCE LETTER (OUTPATIENT)
Age: 42
End: 2022-02-13

## 2022-04-10 ENCOUNTER — HEALTH MAINTENANCE LETTER (OUTPATIENT)
Age: 42
End: 2022-04-10

## 2022-10-16 ENCOUNTER — HEALTH MAINTENANCE LETTER (OUTPATIENT)
Age: 42
End: 2022-10-16

## 2023-03-26 ENCOUNTER — HEALTH MAINTENANCE LETTER (OUTPATIENT)
Age: 43
End: 2023-03-26

## 2023-06-01 ENCOUNTER — HEALTH MAINTENANCE LETTER (OUTPATIENT)
Age: 43
End: 2023-06-01

## 2023-10-02 ENCOUNTER — IMMUNIZATION (OUTPATIENT)
Dept: NURSING | Facility: CLINIC | Age: 43
End: 2023-10-02
Payer: COMMERCIAL

## 2023-10-02 PROCEDURE — 91320 SARSCV2 VAC 30MCG TRS-SUC IM: CPT

## 2023-10-02 PROCEDURE — 90480 ADMN SARSCOV2 VAC 1/ONLY CMP: CPT

## 2024-01-13 ENCOUNTER — HEALTH MAINTENANCE LETTER (OUTPATIENT)
Age: 44
End: 2024-01-13

## 2024-03-23 ENCOUNTER — HEALTH MAINTENANCE LETTER (OUTPATIENT)
Age: 44
End: 2024-03-23

## 2024-08-10 ENCOUNTER — HEALTH MAINTENANCE LETTER (OUTPATIENT)
Age: 44
End: 2024-08-10

## 2025-02-22 ENCOUNTER — HEALTH MAINTENANCE LETTER (OUTPATIENT)
Age: 45
End: 2025-02-22

## 2025-04-12 ENCOUNTER — HEALTH MAINTENANCE LETTER (OUTPATIENT)
Age: 45
End: 2025-04-12